# Patient Record
Sex: FEMALE | Race: OTHER
[De-identification: names, ages, dates, MRNs, and addresses within clinical notes are randomized per-mention and may not be internally consistent; named-entity substitution may affect disease eponyms.]

---

## 2022-06-03 ENCOUNTER — HOSPITAL ENCOUNTER (EMERGENCY)
Dept: HOSPITAL 54 - ER | Age: 27
Discharge: HOME | End: 2022-06-03
Payer: COMMERCIAL

## 2022-06-03 VITALS — WEIGHT: 133 LBS | BODY MASS INDEX: 20.88 KG/M2 | HEIGHT: 67 IN

## 2022-06-03 VITALS — DIASTOLIC BLOOD PRESSURE: 61 MMHG | SYSTOLIC BLOOD PRESSURE: 102 MMHG

## 2022-06-03 DIAGNOSIS — J45.909: ICD-10-CM

## 2022-06-03 DIAGNOSIS — Z88.2: ICD-10-CM

## 2022-06-03 DIAGNOSIS — Z87.42: ICD-10-CM

## 2022-06-03 DIAGNOSIS — N83.202: Primary | ICD-10-CM

## 2022-06-03 LAB
BILIRUB UR QL STRIP: NEGATIVE
COLOR UR: YELLOW
GLUCOSE UR STRIP-MCNC: NEGATIVE MG/DL
LEUKOCYTE ESTERASE UR QL STRIP: NEGATIVE
NITRITE UR QL STRIP: NEGATIVE
PH UR STRIP: 6 [PH] (ref 5–8)
PROT UR QL STRIP: NEGATIVE MG/DL
RBC #/AREA URNS HPF: (no result) /HPF (ref 0–2)
UROBILINOGEN UR STRIP-MCNC: 0.2 EU/DL
WBC #/AREA URNS HPF: (no result) /HPF (ref 0–3)

## 2022-06-03 NOTE — NUR
TO ER BED 10, BIBS C/O LOWER ABD PAIN RAD TO BACK STARTED THIS AM +N/V/D. HX OF 
OVARIAN CYST, AAOX3, BREATHING EVEN AND NON LABORED, AWAITING MD MONACO